# Patient Record
Sex: FEMALE | Race: WHITE | Employment: FULL TIME | URBAN - METROPOLITAN AREA
[De-identification: names, ages, dates, MRNs, and addresses within clinical notes are randomized per-mention and may not be internally consistent; named-entity substitution may affect disease eponyms.]

---

## 2018-10-24 ENCOUNTER — OFFICE VISIT (OUTPATIENT)
Dept: FAMILY MEDICINE CLINIC | Age: 28
End: 2018-10-24
Payer: COMMERCIAL

## 2018-10-24 VITALS
HEIGHT: 65 IN | SYSTOLIC BLOOD PRESSURE: 126 MMHG | HEART RATE: 80 BPM | WEIGHT: 134.2 LBS | OXYGEN SATURATION: 98 % | BODY MASS INDEX: 22.36 KG/M2 | DIASTOLIC BLOOD PRESSURE: 74 MMHG

## 2018-10-24 DIAGNOSIS — Z13.29 THYROID DISORDER SCREENING: ICD-10-CM

## 2018-10-24 DIAGNOSIS — G43.909 MIGRAINE WITHOUT STATUS MIGRAINOSUS, NOT INTRACTABLE, UNSPECIFIED MIGRAINE TYPE: Primary | ICD-10-CM

## 2018-10-24 DIAGNOSIS — Z13.220 SCREENING CHOLESTEROL LEVEL: ICD-10-CM

## 2018-10-24 PROCEDURE — 99204 OFFICE O/P NEW MOD 45 MIN: CPT | Performed by: NURSE PRACTITIONER

## 2018-10-24 ASSESSMENT — PATIENT HEALTH QUESTIONNAIRE - PHQ9
2. FEELING DOWN, DEPRESSED OR HOPELESS: 0
1. LITTLE INTEREST OR PLEASURE IN DOING THINGS: 0
SUM OF ALL RESPONSES TO PHQ QUESTIONS 1-9: 0
SUM OF ALL RESPONSES TO PHQ9 QUESTIONS 1 & 2: 0
SUM OF ALL RESPONSES TO PHQ QUESTIONS 1-9: 0

## 2019-05-21 ENCOUNTER — OFFICE VISIT (OUTPATIENT)
Dept: FAMILY MEDICINE CLINIC | Age: 29
End: 2019-05-21
Payer: COMMERCIAL

## 2019-05-21 VITALS
SYSTOLIC BLOOD PRESSURE: 122 MMHG | TEMPERATURE: 98.8 F | BODY MASS INDEX: 22.63 KG/M2 | WEIGHT: 136 LBS | HEART RATE: 68 BPM | DIASTOLIC BLOOD PRESSURE: 72 MMHG | OXYGEN SATURATION: 98 %

## 2019-05-21 DIAGNOSIS — J06.9 VIRAL URI: Primary | ICD-10-CM

## 2019-05-21 PROCEDURE — 99213 OFFICE O/P EST LOW 20 MIN: CPT | Performed by: NURSE PRACTITIONER

## 2019-05-21 ASSESSMENT — PATIENT HEALTH QUESTIONNAIRE - PHQ9
1. LITTLE INTEREST OR PLEASURE IN DOING THINGS: 0
SUM OF ALL RESPONSES TO PHQ QUESTIONS 1-9: 0
SUM OF ALL RESPONSES TO PHQ9 QUESTIONS 1 & 2: 0
2. FEELING DOWN, DEPRESSED OR HOPELESS: 0
SUM OF ALL RESPONSES TO PHQ QUESTIONS 1-9: 0

## 2019-05-21 NOTE — PROGRESS NOTES
Mohan Ortiz, P-C  [address]  [id number] [facility]. [address]  [phone]  [phone]    Gallo Amaya is a 26 y.o. female who is here with c/o of:    Chief Complaint: Follow-Up from Hospital (urgent care last saturday for diaherra, earaches and is getting worse ); Cough (started sunday night ); and Congestion      Patient Accompanied by: patient    HPI - Gallo Amaya is here today with c/o:    Patient has c/o bilateral ear pain (worse on the left) and ringing in the left ear, sore throat, productive (yellow) cough, and congestion. She also reports diarrhea occurring 3 times daily since this past Saturday. She reports she was seen in the  for this and was reportedly negative for strep and mono. She states she is currently taking azithromycin and prednisone for this problem and states she does not feel much better. Patient Active Problem List:     Dermatitis     Past Medical History:   Diagnosis Date    Cellulitis of digit 8/2013    Migraine       No past surgical history on file. Family History   Problem Relation Age of Onset    Cancer Paternal Aunt 48        breast    High Blood Pressure Father     High Cholesterol Father     Depression Father      Social History     Tobacco Use    Smoking status: Never Smoker    Smokeless tobacco: Never Used   Substance Use Topics    Alcohol use: Yes     ALLERGIES:    Allergies   Allergen Reactions    Amoxicillin     Pcn [Penicillins]           Subjective     · Constitutional:  Negative for activity change, appetite change,unexpected weight change, chills, fever, and fatigue. · HENT: Negative for rhinorrhea, sinus pain, sinus pressure, Positive for bilateral ear pain, sore throat, and congestion  · Eyes:  Negative for pain and discharge. · Respiratory:  Negative for chest tightness, shortness of breath, wheezing, and Positive for cough  · Cardiovascular:  Negative for chest pain, palpitations and leg swelling. · Gastrointestinal: Negative for abdominal pain, blood in stool, constipation, nausea and vomiting. Positive for diarrhea  · Endocrine: Negative for cold intolerance, heat intolerance, polydipsia, polyphagia and polyuria. · Genitourinary: Negative for difficulty urinating, dysuria, flank pain, frequency, hematuria and urgency. · Musculoskeletal: Negative for arthralgias, back pain, joint swelling, myalgias, neck pain and neck stiffness. · Skin: Negative for rash and wound. · Allergic/Immunologic: Negative for environmental allergies and food allergies. · Neurological:  Negative for dizziness, light-headedness, numbness and headaches. · Hematological:  Negative for adenopathy. Does not bruise/bleed easily. · Psychiatric/Behavioral: Negative for self-injury, sleep disturbance and suicidal ideas. Objective     PHYSICAL EXAM:   · Constitutional: Arabella Solano is oriented to person, place, and time. Vital signs are normal. Appears well-developed and well-nourished. · HEENT:   · Head: Normocephalic and atraumatic. Right Ear: Hearing and external ear normal. TM normal  Canal normal  · Left Ear: Hearing and external ear normal. TM normal Canal normal  · Nose: Nares normal. Septum midline. No drainage or sinus tenderness. Mucosa pink and moist  · Mouth/Throat: Oropharynx- No erythema, no exudate. Uvula midline, no erythema, no edema. Mucous membranes are pink and moist.   · Eyes:PERRL, EOMI, Conjunctiva normal, No discharge. · Neck: Full passive range of motion. Non-tender on palpation. Neck supple. No thyromegaly present. Trachea normal.  · Cardiovascular: Normal rate, regular rhythm, S1, S2, no murmur, no gallop, no friction rub, intact distal pulses. · Pulmonary/Chest: Breath sounds are clear throughout, No respiratory distress, No wheezing, No chest tenderness. Effort normal  · Abdominal: Soft. Normal appearance, bowel sounds are present and normoactive. There is no hepatosplenomegaly.  There is no Barriers to medication compliance addressed. All patient questions answered. Pt voiced understanding. 5.  Reviewed prior labs, imaging, consultation, follow up, and health maintenance  6. Continue current medications, diet and exercise. 7. Discussed use, benefit, and side effects of prescribed medications. Barriers to medication compliance addressed. All her questions were answered. Pt voiced understanding. Amparo Diggs will continue current medications, diet and exercise. Patient given educational materials on upper respiratory infection    Of the 15 minute duration appointment visit, Allen Keith CNP spent at least 50% of the face-to-face time in counseling, explanation of diagnosis, planning of further management, and answering all questions. Signed:  Allen Keith CNP    This note is created with the assistance of a speech-recognition program.  While intending to generate a document that actually reflects the content of the visit, no guarantees can be provided that every mistake has been identified and corrected by editing.

## 2019-05-21 NOTE — PROGRESS NOTES
Visit Information    Have you changed or started any medications since your last visit including any over-the-counter medicines, vitamins, or herbal medicines? no   Are you having any side effects from any of your medications? -  no  Have you stopped taking any of your medications? Is so, why? -  no    Have you seen any other physician or provider since your last visit? No  Have you had any other diagnostic tests since your last visit? No  Have you been seen in the emergency room and/or had an admission to a hospital since we last saw you? Yes - Records Requested  Have you had your routine dental cleaning in the past 6 months? yes -     Have you activated your [employer] account? If not, what are your barriers?  Yes     Patient Care Team:  ADIS Medina - CNP as PCP - General (Nurse Practitioner)    Medical History Review  Past Medical, Family, and Social History reviewed and does not contribute to the patient presenting condition    Health Maintenance   Topic Date Due    Varicella Vaccine (1 of 2 - 13+ 2-dose series) 04/04/2002    HIV screen  04/04/2004    DTaP/Tdap/Td vaccine (1 - Tdap) 04/04/2008    Cervical cancer screen  05/21/2015    Flu vaccine (Season Ended) 09/01/2019    Pneumococcal 0-64 years Vaccine  Aged Out

## 2019-06-08 ENCOUNTER — HOSPITAL ENCOUNTER (OUTPATIENT)
Dept: GENERAL RADIOLOGY | Age: 29
Discharge: HOME | End: 2019-06-10

## 2019-06-08 ENCOUNTER — HOSPITAL ENCOUNTER (OUTPATIENT)
Age: 29
Discharge: HOME | End: 2019-06-08

## 2019-06-08 DIAGNOSIS — Z00.00 PHYSICAL EXAM: ICD-10-CM

## 2019-06-08 LAB
ABSOLUTE EOS #: 0.11 K/UL (ref 0–0.44)
ABSOLUTE IMMATURE GRANULOCYTE: <0.03 K/UL (ref 0–0.3)
ABSOLUTE LYMPH #: 2.1 K/UL (ref 1.1–3.7)
ABSOLUTE MONO #: 0.57 K/UL (ref 0.1–1.2)
ALBUMIN SERPL-MCNC: 4.6 G/DL (ref 3.5–5.2)
ALBUMIN/GLOBULIN RATIO: 1.4 (ref 1–2.5)
ALP BLD-CCNC: 56 U/L (ref 35–104)
ALT SERPL-CCNC: 15 U/L (ref 5–33)
ANION GAP SERPL CALCULATED.3IONS-SCNC: 10 MMOL/L (ref 9–17)
AST SERPL-CCNC: 19 U/L
BASOPHILS # BLD: 1 % (ref 0–2)
BASOPHILS ABSOLUTE: 0.05 K/UL (ref 0–0.2)
BILIRUB SERPL-MCNC: 0.81 MG/DL (ref 0.3–1.2)
BUN BLDV-MCNC: 15 MG/DL (ref 6–20)
BUN/CREAT BLD: NORMAL (ref 9–20)
CALCIUM SERPL-MCNC: 9.4 MG/DL (ref 8.6–10.4)
CHLORIDE BLD-SCNC: 105 MMOL/L (ref 98–107)
CHOLESTEROL/HDL RATIO: 2.7
CHOLESTEROL: 157 MG/DL
CO2: 20 MMOL/L (ref 20–31)
CREAT SERPL-MCNC: 0.65 MG/DL (ref 0.5–0.9)
DIFFERENTIAL TYPE: NORMAL
EOSINOPHILS RELATIVE PERCENT: 1 % (ref 1–4)
GFR AFRICAN AMERICAN: >60 ML/MIN
GFR NON-AFRICAN AMERICAN: >60 ML/MIN
GFR SERPL CREATININE-BSD FRML MDRD: NORMAL ML/MIN/{1.73_M2}
GFR SERPL CREATININE-BSD FRML MDRD: NORMAL ML/MIN/{1.73_M2}
GLOBULIN: NORMAL G/DL (ref 1.5–3.8)
GLUCOSE BLD-MCNC: 83 MG/DL (ref 70–99)
HCG QUALITATIVE: NEGATIVE
HCT VFR BLD CALC: 42.7 % (ref 36.3–47.1)
HDLC SERPL-MCNC: 59 MG/DL
HEMOGLOBIN: 13.5 G/DL (ref 11.9–15.1)
IMMATURE GRANULOCYTES: 0 %
IRON: 60 UG/DL (ref 37–145)
LACTATE DEHYDROGENASE: 187 U/L (ref 135–214)
LDL CHOLESTEROL: 85 MG/DL (ref 0–130)
LYMPHOCYTES # BLD: 27 % (ref 24–43)
MCH RBC QN AUTO: 29.7 PG (ref 25.2–33.5)
MCHC RBC AUTO-ENTMCNC: 31.6 G/DL (ref 28.4–34.8)
MCV RBC AUTO: 94.1 FL (ref 82.6–102.9)
MONOCYTES # BLD: 7 % (ref 3–12)
NRBC AUTOMATED: 0 PER 100 WBC
PDW BLD-RTO: 11.9 % (ref 11.8–14.4)
PHOSPHORUS: 4 MG/DL (ref 2.6–4.5)
PLATELET # BLD: 322 K/UL (ref 138–453)
PLATELET ESTIMATE: NORMAL
PMV BLD AUTO: 10.1 FL (ref 8.1–13.5)
POTASSIUM SERPL-SCNC: 3.9 MMOL/L (ref 3.7–5.3)
RBC # BLD: 4.54 M/UL (ref 3.95–5.11)
RBC # BLD: NORMAL 10*6/UL
SEG NEUTROPHILS: 64 % (ref 36–65)
SEGMENTED NEUTROPHILS ABSOLUTE COUNT: 4.94 K/UL (ref 1.5–8.1)
SODIUM BLD-SCNC: 135 MMOL/L (ref 135–144)
TOTAL PROTEIN: 7.8 G/DL (ref 6.4–8.3)
TRIGL SERPL-MCNC: 64 MG/DL
URIC ACID: 3.6 MG/DL (ref 2.4–5.7)
VLDLC SERPL CALC-MCNC: NORMAL MG/DL (ref 1–30)
WBC # BLD: 7.8 K/UL (ref 3.5–11.3)
WBC # BLD: NORMAL 10*3/UL

## 2019-06-08 PROCEDURE — 86480 TB TEST CELL IMMUN MEASURE: CPT

## 2019-06-08 PROCEDURE — 83615 LACTATE (LD) (LDH) ENZYME: CPT

## 2019-06-08 PROCEDURE — 85025 COMPLETE CBC W/AUTO DIFF WBC: CPT

## 2019-06-08 PROCEDURE — 80061 LIPID PANEL: CPT

## 2019-06-08 PROCEDURE — 84100 ASSAY OF PHOSPHORUS: CPT

## 2019-06-08 PROCEDURE — 80053 COMPREHEN METABOLIC PANEL: CPT

## 2019-06-08 PROCEDURE — 83540 ASSAY OF IRON: CPT

## 2019-06-08 PROCEDURE — 36415 COLL VENOUS BLD VENIPUNCTURE: CPT

## 2019-06-08 PROCEDURE — 84550 ASSAY OF BLOOD/URIC ACID: CPT

## 2019-06-08 PROCEDURE — 71045 X-RAY EXAM CHEST 1 VIEW: CPT

## 2019-06-08 PROCEDURE — 84703 CHORIONIC GONADOTROPIN ASSAY: CPT

## 2019-06-10 LAB
QUANTI TB GOLD PLUS: NEGATIVE
QUANTI TB1 MINUS NIL: 0 IU/ML (ref 0–0.34)
QUANTI TB2 MINUS NIL: 0.01 IU/ML (ref 0–0.34)
QUANTIFERON MITOGEN: >10 IU/ML
QUANTIFERON NIL: 0.02 IU/ML

## 2020-08-26 ENCOUNTER — NURSE TRIAGE (OUTPATIENT)
Dept: OTHER | Facility: CLINIC | Age: 30
End: 2020-08-26

## 2020-08-26 NOTE — TELEPHONE ENCOUNTER
Reason for Disposition   Message left on unidentified voice mail. Phone number verified. Protocols used: NO CONTACT OR DUPLICATE CONTACT CALL-ADULT-[place]    Patient called [facility]) to schedule appointment, with red flag complaint, transferred to RN access for triage. [facility] lost contact with pt due to phone system issues. Obtained pt information and returned call by this writer. Attempted to contact pt for triage of possible UTI symptoms. No answer. LVM. No triage at this time. Please do not respond to the triage nurse through this encounter. Any subsequent communication should be directly with the patient.

## 2020-08-31 ENCOUNTER — HOSPITAL ENCOUNTER (OUTPATIENT)
Age: 30
Setting detail: SPECIMEN
Discharge: HOME | End: 2020-08-31
Payer: COMMERCIAL

## 2020-08-31 ENCOUNTER — OFFICE VISIT (OUTPATIENT)
Dept: FAMILY MEDICINE CLINIC | Age: 30
End: 2020-08-31
Payer: COMMERCIAL

## 2020-08-31 VITALS
WEIGHT: 132.6 LBS | TEMPERATURE: 98.8 F | HEIGHT: 64 IN | SYSTOLIC BLOOD PRESSURE: 115 MMHG | BODY MASS INDEX: 22.64 KG/M2 | HEART RATE: 60 BPM | OXYGEN SATURATION: 99 % | DIASTOLIC BLOOD PRESSURE: 80 MMHG

## 2020-08-31 LAB
BILIRUBIN, POC: NORMAL
BLOOD URINE, POC: NORMAL
CLARITY, POC: CLEAR
COLOR, POC: YELLOW
GLUCOSE URINE, POC: NORMAL
KETONES, POC: NORMAL
LEUKOCYTE EST, POC: NORMAL
NITRITE, POC: NORMAL
PH, POC: 7
PROTEIN, POC: NORMAL
SPECIFIC GRAVITY, POC: 1.02
UROBILINOGEN, POC: 0.2

## 2020-08-31 PROCEDURE — 99213 OFFICE O/P EST LOW 20 MIN: CPT | Performed by: NURSE PRACTITIONER

## 2020-08-31 PROCEDURE — 81002 URINALYSIS NONAUTO W/O SCOPE: CPT | Performed by: NURSE PRACTITIONER

## 2020-08-31 SDOH — ECONOMIC STABILITY: FOOD INSECURITY: WITHIN THE PAST 12 MONTHS, THE FOOD YOU BOUGHT JUST DIDN'T LAST AND YOU DIDN'T HAVE MONEY TO GET MORE.: NEVER TRUE

## 2020-08-31 SDOH — ECONOMIC STABILITY: INCOME INSECURITY: HOW HARD IS IT FOR YOU TO PAY FOR THE VERY BASICS LIKE FOOD, HOUSING, MEDICAL CARE, AND HEATING?: NOT HARD AT ALL

## 2020-08-31 SDOH — ECONOMIC STABILITY: FOOD INSECURITY: WITHIN THE PAST 12 MONTHS, YOU WORRIED THAT YOUR FOOD WOULD RUN OUT BEFORE YOU GOT MONEY TO BUY MORE.: NEVER TRUE

## 2020-08-31 ASSESSMENT — PATIENT HEALTH QUESTIONNAIRE - PHQ9
SUM OF ALL RESPONSES TO PHQ QUESTIONS 1-9: 0
SUM OF ALL RESPONSES TO PHQ9 QUESTIONS 1 & 2: 0
1. LITTLE INTEREST OR PLEASURE IN DOING THINGS: 0
2. FEELING DOWN, DEPRESSED OR HOPELESS: 0
SUM OF ALL RESPONSES TO PHQ QUESTIONS 1-9: 0

## 2020-08-31 NOTE — PROGRESS NOTES
Sarah Whitehead, YU-C  [address]  [id number] [facility]. [address]  [phone]  [phone]    Gregg Guan is a 31 y.o. female who is here with c/o of:    Chief Complaint: Dysuria      Patient Accompanied by: n/a    HPI - Gregg Guan is here today with c/o:    Dysuria   - c/o sharp pain to left groin after urinating that started last Sunday (1 week ago)   - she reports the pain resolves after urinating   - she denies urethral pain, frequency, urgency, flank pain    Diarrhea   - this has been a problem for several years (15 years)   - she reports all b.m's are \"straight liquid\" and has had incontinence due to this issue   - she has seen previous provider for this and was told to track her diet and has not had f/u with gastroenterology   - she states this is impacting her life       Patient Active Problem List:     Dermatitis     Past Medical History:   Diagnosis Date    Cellulitis of digit 8/2013    Migraine       No past surgical history on file. Family History   Problem Relation Age of Onset    Cancer Paternal Aunt 48        breast    High Blood Pressure Father     High Cholesterol Father     Depression Father      Social History     Tobacco Use    Smoking status: Never Smoker    Smokeless tobacco: Never Used   Substance Use Topics    Alcohol use: Yes     ALLERGIES:    Allergies   Allergen Reactions    Amoxicillin     Pcn [Penicillins]           Subjective     · Constitutional:  Negative for activity change, appetite change,unexpected weight change, chills, fever, and fatigue. · HENT: Negative for ear pain, sore throat,  Rhinorrhea, sinus pain, sinus pressure, congestion. · Eyes:  Negative for pain and discharge. · Respiratory:  Negative for chest tightness, shortness of breath, wheezing, and cough. · Cardiovascular:  Negative for chest pain, palpitations and leg swelling.    · Gastrointestinal: Negative for abdominal pain, blood in stool, constipation, nausea and vomiting. Positive for diarrhea  · Endocrine: Negative for cold intolerance, heat intolerance, polydipsia, polyphagia and polyuria. · Genitourinary: Negative for difficulty urinating, flank pain, frequency, hematuria and urgency. Positive for dysuria  · Musculoskeletal: Negative for arthralgias, back pain, joint swelling, myalgias, neck pain and neck stiffness. · Skin: Negative for rash and wound. · Allergic/Immunologic: Negative for environmental allergies and food allergies. · Neurological:  Negative for dizziness, light-headedness, numbness and headaches. · Hematological:  Negative for adenopathy. Does not bruise/bleed easily. · Psychiatric/Behavioral: Negative for self-injury, sleep disturbance and suicidal ideas. Objective     PHYSICAL EXAM:   · Constitutional: Merna Parnell is oriented to person, place, and time. Vital signs are normal. Appears well-developed and well-nourished. · HEENT:   · Head: Normocephalic and atraumatic. Right Ear: Hearing and external ear normal.     · Left Ear: Hearing and external ear normal.    · Eyes:PERRL, EOMI, Conjunctiva normal, No discharge. · Neck: Full passive range of motion. Non-tender on palpation. Neck supple. No thyromegaly present. Trachea normal.  · Cardiovascular: Normal rate, regular rhythm, S1, S2, no murmur, no gallop, no friction rub, intact distal pulses. · Pulmonary/Chest: Breath sounds are clear throughout, No respiratory distress, No wheezing, No chest tenderness. Effort normal  · Abdominal: Soft. Normal appearance, bowel sounds are present and normoactive. There is no hepatosplenomegaly. There is no tenderness. There is no CVA tenderness. · Musculoskeletal: Extremities appear regular and symmetric. No evident masses, lesions, foreign bodies, or other abnormalities. No edema. No tenderness on palpation. Joints are stable. Full ROM, strength and tone are within normal limits. · Lymphadenopathy: No lymphadenopathy noted. · Neurological: Alert and oriented to person, place, and time. Normal motor function, Normal sensory function, No focal deficits noted. He has normal strength. · Skin: Skin is warm, dry and intact. No obvious lesions on exposed skin  · Psychiatric: Normal mood and affect. Speech is normal and behavior is normal.     Nursing note and vitals reviewed. Blood pressure 115/80, pulse 60, temperature 98.8 °F (37.1 °C), temperature source Temporal, height 5' 4\" (1.626 m), weight 132 lb 9.6 oz (60.1 kg), SpO2 99 %. Body mass index is 22.76 kg/m². Wt Readings from Last 3 Encounters:   08/31/20 132 lb 9.6 oz (60.1 kg)   05/21/19 136 lb (61.7 kg)   10/24/18 134 lb 3.2 oz (60.9 kg)     BP Readings from Last 3 Encounters:   08/31/20 115/80   05/21/19 122/72   10/24/18 126/74       Results for POC orders placed in visit on 08/31/20   POCT Urinalysis no Micro   Result Value Ref Range    Color, UA Yellow     Clarity, UA Clear     Glucose, UA POC Neg     Bilirubin, UA Neg     Ketones, UA Neg     Spec Grav, UA 1.025     Blood, UA POC Neg     pH, UA 7.0     Protein, UA POC Neg     Urobilinogen, UA 0.2     Leukocytes, UA Neg     Nitrite, UA Neg        Completed Orders/Prescriptions   No orders of the defined types were placed in this encounter. AssessmentPlan/Medical Decision Making     1. Pelvic pain in female  - I feel this is related to the chronic diarrhea and have referred to GI  - further dx testing pending GI consultation  - POCT Urinalysis no Micro  - Culture, Urine; Future    2. Chronic diarrhea  - Jennifer Dumont MD, Gastroenterology, [address]          Return if symptoms worsen or fail to improve. 1.  Christie received counseling on the following healthy behaviors: nutrition, exercise and medication adherence  2. Patient given educational materials - see patient instructions  3. Was a self-tracking handout given in paper form or via [email or web address]? No  If yes, see orders or list here.   4. Discussed use, benefit, and side effects of prescribed medications. Barriers to medication compliance addressed. All patient questions answered. Pt voiced understanding. 5.  Reviewed prior labs, imaging, consultation, follow up, and health maintenance  6. Continue current medications, diet and exercise. 7. Discussed use, benefit, and side effects of prescribed medications. Barriers to medication compliance addressed. All her questions were answered. Pt voiced understanding. Ronn Bhagat will continue current medications, diet and exercise. Of the 15 minute duration appointment visit, Izzy Robles CNP spent at least 50% of the face-to-face time in counseling, explanation of diagnosis, planning of further management, and answering all questions. Signed:  Izzy Robles CNP    This note is created with the assistance of a speech-recognition program.  While intending to generate a document that actually reflects the content of the visit, no guarantees can be provided that every mistake has been identified and corrected by editing.

## 2020-09-01 LAB
CULTURE: NO GROWTH
Lab: NORMAL
SPECIMEN DESCRIPTION: NORMAL

## 2020-10-12 ENCOUNTER — HOSPITAL ENCOUNTER (OUTPATIENT)
Age: 30
Setting detail: SPECIMEN
Discharge: HOME | End: 2020-10-12
Payer: COMMERCIAL

## 2020-10-12 ENCOUNTER — OFFICE VISIT (OUTPATIENT)
Dept: GASTROENTEROLOGY | Age: 30
End: 2020-10-12
Payer: COMMERCIAL

## 2020-10-12 VITALS — BODY MASS INDEX: 23 KG/M2 | WEIGHT: 134 LBS

## 2020-10-12 PROCEDURE — 99204 OFFICE O/P NEW MOD 45 MIN: CPT | Performed by: INTERNAL MEDICINE

## 2020-10-12 RX ORDER — SODIUM, POTASSIUM,MAG SULFATES 17.5-3.13G
SOLUTION, RECONSTITUTED, ORAL ORAL
Qty: 1 BOTTLE | Refills: 0 | Status: SHIPPED | OUTPATIENT
Start: 2020-10-12 | End: 2020-12-07

## 2020-10-12 RX ORDER — OCTISALATE, AVOBENZONE, HOMOSALATE, AND OCTOCRYLENE 29.4; 29.4; 49; 25.48 MG/ML; MG/ML; MG/ML; MG/ML
1 LOTION TOPICAL DAILY
Qty: 30 CAPSULE | Refills: 1 | Status: SHIPPED | OUTPATIENT
Start: 2020-10-12 | End: 2020-12-07 | Stop reason: SDUPTHER

## 2020-10-12 ASSESSMENT — ENCOUNTER SYMPTOMS
SORE THROAT: 0
RECTAL PAIN: 0
VOICE CHANGE: 1
WHEEZING: 0
ABDOMINAL DISTENTION: 1
SINUS PRESSURE: 1
ANAL BLEEDING: 1
ABDOMINAL PAIN: 1
NAUSEA: 0
COUGH: 0
BACK PAIN: 0
DIARRHEA: 1
VOMITING: 0
CONSTIPATION: 1
TROUBLE SWALLOWING: 0
BLOOD IN STOOL: 0
CHOKING: 0

## 2020-10-12 NOTE — PROGRESS NOTES
Reason for Referral:   García Porras, APRN - CNP  [address] [facility]    Chief Complaint   Patient presents with    Diarrhea     NP referral for chronic diarrhea, c/o IBS like symptoms, some rectal bleeding as well           HISTORY OF PRESENT ILLNESS: Sherita Herman is a 31 y.o. female , referred for evaluation of diarrhea and rectal bleeding. She reports issues since 2007. Urgency. Loose bowel movement. Sometimes with bright red blood. No abdominal pain. No nausea or vomiting. No weight loss. No skin rash or joint swelling. Occasional sores in her mouth. Urgency is typically after eating fatty foods sometimes after eating ice cream.  No prior endoscopy. No family history of known GI pathology. She smokes marijuana occasionally. She reports drinking around 25 alcoholic drinks per week. She has been doing that for around 5 years. Past Medical,Family, and Social History reviewed and does not contribute to the patient presentingcondition. Patient's PMH/PSH,SH,PSYCH Hx, MEDs, ALLERGIES, and ROS were all reviewed and updated in the appropriate sections. PAST MEDICAL HISTORY:  Past Medical History:   Diagnosis Date    Cellulitis of digit 8/2013    Chronic diarrhea     Migraine        No past surgical history on file. CURRENT MEDICATIONS:  No current outpatient medications on file.     ALLERGIES:   Allergies   Allergen Reactions    Amoxicillin     Pcn [Penicillins]        FAMILY HISTORY:       Problem Relation Age of Onset    Cancer Paternal Aunt 48        breast    High Blood Pressure Father     High Cholesterol Father     Depression Father          SOCIAL HISTORY:   Social History     Socioeconomic History    Marital status: Single     Spouse name: Not on file    Number of children: Not on file    Years of education: Not on file    Highest education level: Not on file   Occupational History    Not on file   Social Needs    Financial resource strain: Not hard at all    Food insecurity     Worry: Never true     Inability: Never true   [employer] needs     Medical: Not on file     Non-medical: Not on file   Tobacco Use    Smoking status: Never Smoker    Smokeless tobacco: Never Used   Substance and Sexual Activity    Alcohol use: Yes    Drug use: No     Types: Marijuana    Sexual activity: Yes   Lifestyle    Physical activity     Days per week: Not on file     Minutes per session: Not on file    Stress: Not on file   Relationships    Social connections     Talks on phone: Not on file     Gets together: Not on file     Attends Sikh service: Not on file     Active member of club or organization: Not on file     Attends meetings of clubs or organizations: Not on file     Relationship status: Not on file    Intimate partner violence     Fear of current or ex partner: Not on file     Emotionally abused: Not on file     Physically abused: Not on file     Forced sexual activity: Not on file   Other Topics Concern    Not on file   Social History Narrative    Not on file       REVIEW OF SYSTEMS: A 12-point review of systemswas obtained and pertinent positives and negatives were enumerated above in the history of present illness. All other reviewed systems / symptoms were negative. Review of Systems   Constitutional: Negative for appetite change, fatigue and unexpected weight change. HENT: Positive for postnasal drip, sinus pressure and voice change. Negative for dental problem, sore throat and trouble swallowing. Eyes: Positive for visual disturbance. Respiratory: Negative for cough, choking and wheezing. Cardiovascular: Negative for chest pain, palpitations and leg swelling. Gastrointestinal: Positive for abdominal distention, abdominal pain, anal bleeding, constipation and diarrhea. Negative for blood in stool, nausea, rectal pain and vomiting. GERD-some burning   Genitourinary: Negative for difficulty urinating. Musculoskeletal: Negative for arthralgias, back pain, gait problem and myalgias. Allergic/Immunologic: Negative for environmental allergies and food allergies. Neurological: Negative for dizziness, weakness, light-headedness, numbness and headaches. Hematological: Does not bruise/bleed easily. Psychiatric/Behavioral: Negative for sleep disturbance. The patient is not nervous/anxious. LABORATORY DATA: Reviewed  Lab Results   Component Value Date    WBC 7.8 06/08/2019    HGB 13.5 06/08/2019    HCT 42.7 06/08/2019    MCV 94.1 06/08/2019     06/08/2019     06/08/2019    K 3.9 06/08/2019     06/08/2019    CO2 20 06/08/2019    BUN 15 06/08/2019    CREATININE 0.65 06/08/2019    LABALBU 4.6 06/08/2019    BILITOT 0.81 06/08/2019    ALKPHOS 56 06/08/2019    AST 19 06/08/2019    ALT 15 06/08/2019         Lab Results   Component Value Date    RBC 4.54 06/08/2019    HGB 13.5 06/08/2019    MCV 94.1 06/08/2019    MCH 29.7 06/08/2019    MCHC 31.6 06/08/2019    RDW 11.9 06/08/2019    MPV 10.1 06/08/2019    BASOPCT 1 06/08/2019    LYMPHSABS 2.10 06/08/2019    MONOSABS 0.57 06/08/2019    NEUTROABS 4.94 06/08/2019    EOSABS 0.11 06/08/2019    BASOSABS 0.05 06/08/2019         DIAGNOSTIC TESTING:     No results found. There were no vitals taken for this visit. PHYSICAL EXAMINATION: Vital signs reviewed per the nursing documentation. There is no height or weight on file to calculate BMI. Physical Exam      I personally reviewed the nurse's notes and documentation and I agree with her notes. General: alert, appears stated age and cooperative Psych: Normal. and Alert and oriented, appropriate affect. . Normal affect. Mentation normal  HEENT: PERRLA. Clear conjunctivae and sclerae. Moist oral mucosae, no lesions or ulcers. The neck is supple, without lymphadenopathy or jugular venous distension. No masses. Normal thyroid. Cardiovascular: S1 S2 RRR no rubs or murmurs.   Pulmonary: clear BL. No accessory muscle usage. Abdominal Exam: Soft, NT ND, no hepato or spleno megaly, +BS, no ascites. No groin masses or lymphadenopathy. Extremities: no lower ext edema. Skin: Warm skin. No skin rash. No spider nevi palmar erythema nail dystrophy. Joint: No joint swelling or deformity. Neurological: intact sensory. DTR+. IMPRESSION: Ms. Cherie Gutierrez is a 31 y.o. female with intermittent diarrhea with urgency. Rectal bleeding. We discussed differential diagnosis. Dietary changes. Trial of align. We will obtain celiac serology. Plan for colonoscopy with biopsies. Follow-up in 1 month. We recommend the patient to cut down on alcohol. We discussed potential complications. Spent 30 minutes providing patient education and counseling. Thank you for allowing me to participate in the care of Ms. Cherie Gutierrez. For any further questions please do not hesitate to contact me. I have reviewed and agree with the MA/LPN ROS. Note is dictated utilizing voice recognition software. Unfortunately this leads to occasional typographical errors. Please contact our office if you have any questions.     Elliott Lu MD  [facility]  O: #[phone]

## 2020-10-13 LAB — TISSUE TRANSGLUTAMINASE IGA: 0.3 U/ML

## 2020-10-19 ENCOUNTER — HOSPITAL ENCOUNTER (OUTPATIENT)
Dept: PREADMISSION TESTING | Age: 30
Setting detail: SPECIMEN
Discharge: HOME | End: 2020-10-23
Payer: COMMERCIAL

## 2020-11-19 ENCOUNTER — HOSPITAL ENCOUNTER (OUTPATIENT)
Dept: PREADMISSION TESTING | Age: 30
Setting detail: SPECIMEN
Discharge: HOME | End: 2020-11-23
Payer: COMMERCIAL

## 2020-11-19 PROCEDURE — U0003 INFECTIOUS AGENT DETECTION BY NUCLEIC ACID (DNA OR RNA); SEVERE ACUTE RESPIRATORY SYNDROME CORONAVIRUS 2 (SARS-COV-2) (CORONAVIRUS DISEASE [COVID-19]), AMPLIFIED PROBE TECHNIQUE, MAKING USE OF HIGH THROUGHPUT TECHNOLOGIES AS DESCRIBED BY CMS-2020-01-R: HCPCS

## 2020-11-21 LAB — SARS-COV-2, NAA: NOT DETECTED

## 2020-11-23 ENCOUNTER — ANESTHESIA (OUTPATIENT)
Dept: OPERATING ROOM | Age: 30
End: 2020-11-23
Payer: COMMERCIAL

## 2020-11-23 ENCOUNTER — ANESTHESIA EVENT (OUTPATIENT)
Dept: OPERATING ROOM | Age: 30
End: 2020-11-23
Payer: COMMERCIAL

## 2020-11-23 ENCOUNTER — HOSPITAL ENCOUNTER (OUTPATIENT)
Age: 30
Setting detail: OUTPATIENT SURGERY
Discharge: HOME | End: 2020-11-23
Attending: INTERNAL MEDICINE | Admitting: INTERNAL MEDICINE
Payer: COMMERCIAL

## 2020-11-23 VITALS
DIASTOLIC BLOOD PRESSURE: 72 MMHG | BODY MASS INDEX: 23.92 KG/M2 | TEMPERATURE: 97.4 F | RESPIRATION RATE: 14 BRPM | SYSTOLIC BLOOD PRESSURE: 104 MMHG | WEIGHT: 135 LBS | HEART RATE: 66 BPM | OXYGEN SATURATION: 100 % | HEIGHT: 63 IN

## 2020-11-23 VITALS
RESPIRATION RATE: 24 BRPM | OXYGEN SATURATION: 99 % | DIASTOLIC BLOOD PRESSURE: 66 MMHG | SYSTOLIC BLOOD PRESSURE: 108 MMHG

## 2020-11-23 LAB — HCG, PREGNANCY URINE (POC): NEGATIVE

## 2020-11-23 PROCEDURE — 3700000001 HC ADD 15 MINUTES (ANESTHESIA): Performed by: INTERNAL MEDICINE

## 2020-11-23 PROCEDURE — 7100000011 HC PHASE II RECOVERY - ADDTL 15 MIN: Performed by: INTERNAL MEDICINE

## 2020-11-23 PROCEDURE — 2580000003 HC RX 258: Performed by: ANESTHESIOLOGY

## 2020-11-23 PROCEDURE — 81025 URINE PREGNANCY TEST: CPT

## 2020-11-23 PROCEDURE — 88305 TISSUE EXAM BY PATHOLOGIST: CPT

## 2020-11-23 PROCEDURE — 3609010300 HC COLONOSCOPY W/BIOPSY SINGLE/MULTIPLE: Performed by: INTERNAL MEDICINE

## 2020-11-23 PROCEDURE — 6360000002 HC RX W HCPCS: Performed by: NURSE ANESTHETIST, CERTIFIED REGISTERED

## 2020-11-23 PROCEDURE — 7100000010 HC PHASE II RECOVERY - FIRST 15 MIN: Performed by: INTERNAL MEDICINE

## 2020-11-23 PROCEDURE — 3700000000 HC ANESTHESIA ATTENDED CARE: Performed by: INTERNAL MEDICINE

## 2020-11-23 PROCEDURE — 45380 COLONOSCOPY AND BIOPSY: CPT | Performed by: INTERNAL MEDICINE

## 2020-11-23 PROCEDURE — 2709999900 HC NON-CHARGEABLE SUPPLY: Performed by: INTERNAL MEDICINE

## 2020-11-23 RX ORDER — LIDOCAINE HYDROCHLORIDE 10 MG/ML
1 INJECTION, SOLUTION EPIDURAL; INFILTRATION; INTRACAUDAL; PERINEURAL
Status: DISCONTINUED | OUTPATIENT
Start: 2020-11-23 | End: 2020-11-23 | Stop reason: HOSPADM

## 2020-11-23 RX ORDER — HYDROMORPHONE HCL 110MG/55ML
0.25 PATIENT CONTROLLED ANALGESIA SYRINGE INTRAVENOUS EVERY 5 MIN PRN
Status: DISCONTINUED | OUTPATIENT
Start: 2020-11-23 | End: 2020-11-23 | Stop reason: HOSPADM

## 2020-11-23 RX ORDER — PROPOFOL 10 MG/ML
INJECTION, EMULSION INTRAVENOUS PRN
Status: DISCONTINUED | OUTPATIENT
Start: 2020-11-23 | End: 2020-11-23 | Stop reason: SDUPTHER

## 2020-11-23 RX ORDER — ONDANSETRON 2 MG/ML
4 INJECTION INTRAMUSCULAR; INTRAVENOUS
Status: DISCONTINUED | OUTPATIENT
Start: 2020-11-23 | End: 2020-11-23 | Stop reason: HOSPADM

## 2020-11-23 RX ORDER — FENTANYL CITRATE 50 UG/ML
25 INJECTION, SOLUTION INTRAMUSCULAR; INTRAVENOUS EVERY 5 MIN PRN
Status: DISCONTINUED | OUTPATIENT
Start: 2020-11-23 | End: 2020-11-23 | Stop reason: HOSPADM

## 2020-11-23 RX ORDER — SODIUM CHLORIDE, SODIUM LACTATE, POTASSIUM CHLORIDE, CALCIUM CHLORIDE 600; 310; 30; 20 MG/100ML; MG/100ML; MG/100ML; MG/100ML
INJECTION, SOLUTION INTRAVENOUS CONTINUOUS
Status: DISCONTINUED | OUTPATIENT
Start: 2020-11-23 | End: 2020-11-23 | Stop reason: HOSPADM

## 2020-11-23 RX ADMIN — PROPOFOL 50 MG: 10 INJECTION, EMULSION INTRAVENOUS at 09:14

## 2020-11-23 RX ADMIN — PROPOFOL 50 MG: 10 INJECTION, EMULSION INTRAVENOUS at 09:11

## 2020-11-23 RX ADMIN — PROPOFOL 50 MG: 10 INJECTION, EMULSION INTRAVENOUS at 09:08

## 2020-11-23 RX ADMIN — PROPOFOL 50 MG: 10 INJECTION, EMULSION INTRAVENOUS at 09:13

## 2020-11-23 RX ADMIN — SODIUM CHLORIDE, POTASSIUM CHLORIDE, SODIUM LACTATE AND CALCIUM CHLORIDE: 600; 310; 30; 20 INJECTION, SOLUTION INTRAVENOUS at 08:08

## 2020-11-23 RX ADMIN — PROPOFOL 50 MG: 10 INJECTION, EMULSION INTRAVENOUS at 09:15

## 2020-11-23 RX ADMIN — PROPOFOL 50 MG: 10 INJECTION, EMULSION INTRAVENOUS at 09:12

## 2020-11-23 RX ADMIN — PROPOFOL 50 MG: 10 INJECTION, EMULSION INTRAVENOUS at 09:17

## 2020-11-23 RX ADMIN — PROPOFOL 50 MG: 10 INJECTION, EMULSION INTRAVENOUS at 09:10

## 2020-11-23 RX ADMIN — PROPOFOL 50 MG: 10 INJECTION, EMULSION INTRAVENOUS at 09:09

## 2020-11-23 ASSESSMENT — PAIN SCALES - GENERAL
PAINLEVEL_OUTOF10: 0

## 2020-11-23 ASSESSMENT — PULMONARY FUNCTION TESTS
PIF_VALUE: 0

## 2020-11-23 ASSESSMENT — PAIN - FUNCTIONAL ASSESSMENT: PAIN_FUNCTIONAL_ASSESSMENT: 0-10

## 2020-11-23 NOTE — H&P
History and Physical Service   [facility] [place] - INPATIENT    HISTORY AND PHYSICAL EXAMINATION            Date of Evaluation: 11/23/2020  Patient name:  Leslie Hartley  MRN:   [id number]  YOB: 1990  PCP:    ADIS Trevino CNP    History Obtained From:     Patient, medical records    History of Present Illness: This is Leslie Hartley a 31 y.o. female who presents today for a diagnostic colonoscopy by Dr. Charles Avila for diarrhea. Patient c/o fecal urgency with loose stool intermittently since 2007 that occurs mostly past eating fatty foods or ice cream according to Dr Joshua Madrigal note of 10/12/20. Occasionally has blood when wiping but not mixed with stools  No previous colonoscopy  No FH colon cancer or polyps  Takes probiotics  Patient followed the bowel prep until watery clear  She denies new bowel changes, bloody tarry stools, abdominal pain, or unintentional weight loss. Past Medical History:     Past Medical History:   Diagnosis Date    Cellulitis of digit 8/2013    Chronic diarrhea     Migraine         Past Surgical History:     History reviewed. No pertinent surgical history. Medications Prior to Admission:     Prior to Admission medications    Medication Sig Start Date End Date Taking? Authorizing Provider   Probiotic Product (ALIGN) 4 MG CAPS Take 1 capsule by mouth daily 10/12/20  Yes Charles Avila MD   Na Sulfate-K Sulfate-Mg Sulf 17.5-3.13-1.6 GM/177ML SOLN Please follow instructions given by office 10/12/20  Yes Charles Avila MD        Allergies:     Amoxicillin and Pcn [penicillins]    Social History:     Tobacco:    reports that she has never smoked. She has never used smokeless tobacco.  Alcohol:      reports current alcohol use of about 2.0 standard drinks of alcohol per week. Drug Use:  reports no history of drug use.     Family History:     Family History   Problem Relation Age of Onset    Cancer Paternal Aunt 48        breast    High Blood Pressure Father     High Cholesterol Father     Depression Father        Review of Systems:     Positive and Negative as described in HPI. CONSTITUTIONAL:  negative for fevers, chills, sweats, fatigue, weight loss  HEENT:  negative for vision, hearing changes, runny nose, throat pain  RESPIRATORY:  negative for shortness of breath, cough, congestion, wheezing. CARDIOVASCULAR:  negative for chest pain, palpitations. GASTROINTESTINAL: See HPI  negative for nausea, vomiting, diarrhea, constipation, change in bowel habits, abdominal pain   GENITOURINARY:  negative for difficulty of urination, burning with urination, frequency   INTEGUMENT:  negative for rash, skin lesions, easy bruising   HEMATOLOGIC/LYMPHATIC:  negative for swelling/edema   ALLERGIC/IMMUNOLOGIC:  negative for urticaria , itching  ENDOCRINE:  negative increase in drinking, increase in urination, hot or cold intolerance  MUSCULOSKELETAL:  negative joint pains, muscle aches, swelling of joints  NEUROLOGICAL:  negative for headaches, dizziness, lightheadedness, numbness, pain, tingling extremities  BEHAVIOR/PSYCH:  negative for depression, anxiety    Physical Exam:   /88   Pulse 99   Temp 96 °F (35.6 °C) (Oral)   Resp 16   Ht 5' 3\" (1.6 m)   Wt 135 lb (61.2 kg)   SpO2 97%   BMI 23.91 kg/m²   No LMP recorded.   No results for input(s): POCGLU in the last 72 hours. General Appearance:  alert, well appearing, and in no acute distress  Mental status: oriented to person, place, and time with normal affect  Head:  normocephalic, atraumatic.   Eye: no icterus, redness, pupils equal and reactive, extraocular eye movements intact, conjunctiva clear  Ear: normal external ear, no discharge, hearing intact  Nose:  no drainage noted  Mouth: mucous membranes moist  Neck: supple, no carotid bruits, thyroid not palpable  Lungs: Bilateral equal air entry, clear to ausculation, no wheezing, rales or rhonchi, normal effort  Cardiovascular: HR 99  normal rate, regular rhythm, no murmur, gallop, rub. Abdomen: mildly tender lower quadrants, nondistended, normal bowel sounds  Neurologic: There are no new focal motor or sensory deficits, normal muscle tone and bulk, no abnormal sensation, normal speech, cranial nerves II through XII grossly intact  Skin: No gross lesions, rashes, bruising or bleeding on exposed skin area  Extremities:  peripheral pulses palpable, no pedal edema or calf pain with palpation  Psych: normal affect     Investigations:      Laboratory Testing:  Recent Results (from the past 24 hour(s))   POCT urine pregnancy    Collection Time: 11/23/20  7:57 AM   Result Value Ref Range    HCG, Pregnancy Urine (POC) NEGATIVE NEGATIVE       Recent Labs     11/23/20  0757   HCG NEGATIVE       Recent Labs     11/19/20  0857   COVID19 Not Detected     Imaging/Diagnostics:    No results found. Diagnosis:      1. Diarrhea     Plans:     1.  Diagnostic colonoscopy       ADIS Donnelly CNP  11/23/2020  8:15 AM

## 2020-11-23 NOTE — ANESTHESIA PRE PROCEDURE
Department of Anesthesiology  Preprocedure Note       Name:  Judith Aguirre   Age:  31 y.o.  :  1990                                          MRN:  [id number]         Date:  2020      Surgeon: Jose Franco):  Kell Beverly MD    Procedure: Procedure(s):  COLONOSCOPY DIAGNOSTIC    Medications prior to admission:   Prior to Admission medications    Medication Sig Start Date End Date Taking? Authorizing Provider   Probiotic Product (ALIGN) 4 MG CAPS Take 1 capsule by mouth daily 10/12/20  Yes Kell Beverly MD   Na Sulfate-K Sulfate-Mg Sulf 17.5-3.13-1.6 GM/177ML SOLN Please follow instructions given by office 10/12/20  Yes Kell Beverly MD       Current medications:    Current Facility-Administered Medications   Medication Dose Route Frequency Provider Last Rate Last Dose    lactated ringers infusion   Intravenous Continuous Rito Gan MD 50 mL/hr at 20 0808      lidocaine PF 1 % injection 1 mL  1 mL Intradermal Once PRN Rito Gan MD           Allergies: Allergies   Allergen Reactions    Amoxicillin     Pcn [Penicillins]        Problem List:    Patient Active Problem List   Diagnosis Code    Dermatitis L30.9    Chronic diarrhea K52.9       Past Medical History:        Diagnosis Date    Cellulitis of digit 2013    Chronic diarrhea     Migraine        Past Surgical History:  History reviewed. No pertinent surgical history. Social History:    Social History     Tobacco Use    Smoking status: Never Smoker    Smokeless tobacco: Never Used   Substance Use Topics    Alcohol use:  Yes     Alcohol/week: 2.0 standard drinks     Types: 2 Glasses of wine per week                                Counseling given: Not Answered      Vital Signs (Current):   Vitals:    20 0754 20 0757   BP:  120/88   Pulse:  99   Resp:  16   Temp:  96 °F (35.6 °C)   TempSrc:  Oral   SpO2:  97%   Weight: 135 lb (61.2 kg)    Height: 5' 3\" (1.6 m)                                               BP Readings from Last 3 Encounters:   11/23/20 120/88   08/31/20 115/80   05/21/19 122/72       NPO Status: Time of last liquid consumption: 0230                        Time of last solid consumption: 2100                        Date of last liquid consumption: 11/23/20                        Date of last solid food consumption: 11/21/20    BMI:   Wt Readings from Last 3 Encounters:   11/23/20 135 lb (61.2 kg)   10/12/20 134 lb (60.8 kg)   08/31/20 132 lb 9.6 oz (60.1 kg)     Body mass index is 23.91 kg/m². CBC:   Lab Results   Component Value Date    WBC 7.8 06/08/2019    RBC 4.54 06/08/2019    HGB 13.5 06/08/2019    HCT 42.7 06/08/2019    MCV 94.1 06/08/2019    RDW 11.9 06/08/2019     06/08/2019       CMP:   Lab Results   Component Value Date     06/08/2019    K 3.9 06/08/2019     06/08/2019    CO2 20 06/08/2019    BUN 15 06/08/2019    CREATININE 0.65 06/08/2019    GFRAA >60 06/08/2019    LABGLOM >60 06/08/2019    GLUCOSE 83 06/08/2019    PROT 7.8 06/08/2019    CALCIUM 9.4 06/08/2019    BILITOT 0.81 06/08/2019    ALKPHOS 56 06/08/2019    AST 19 06/08/2019    ALT 15 06/08/2019       POC Tests: No results for input(s): POCGLU, POCNA, POCK, POCCL, POCBUN, POCHEMO, POCHCT in the last 72 hours.     Coags: No results found for: PROTIME, INR, APTT    HCG (If Applicable):   Lab Results   Component Value Date    PREGTESTUR neg 05/21/2012    HCG NEGATIVE 11/23/2020        ABGs: No results found for: PHART, PO2ART, IDL9LPX, VGZ1AFP, BEART, W8AZDLOE     Type & Screen (If Applicable):  No results found for: LABABO, LABRH    Drug/Infectious Status (If Applicable):  No results found for: HIV, HEPCAB    COVID-19 Screening (If Applicable):   Lab Results   Component Value Date    COVID19 Not Detected 11/19/2020         Anesthesia Evaluation   no history of anesthetic complications:   Airway: Mallampati: II  TM distance: >3 FB   Neck ROM: full  Mouth opening: > = 3 FB Dental:          Pulmonary:Negative Pulmonary ROS and normal exam                               Cardiovascular:  Exercise tolerance: good (>4 METS),       (-)  angina                Neuro/Psych:   Negative Neuro/Psych ROS              GI/Hepatic/Renal: Neg GI/Hepatic/Renal ROS            Endo/Other: Negative Endo/Other ROS                    Abdominal:           Vascular:                                        Anesthesia Plan      TIVA     ASA 1       Induction: intravenous. MIPS: Prophylactic antiemetics administered. Anesthetic plan and risks discussed with patient. Plan discussed with CRNA.     Attending anesthesiologist reviewed and agrees with Ameya Dumont MD   11/23/2020

## 2020-11-23 NOTE — OP NOTE
[facility]     PROCEDURE DATE: 11/23/20    REFERRING PHYSICIAN: No ref. provider found     PRIMARY CARE PROVIDER: ADIS Huggins CNP    ATTENDING PHYSICIAN: Kell Beverly MD     HISTORY: Ms. Judith Aguirre is a 31 y.o. female who presents to the [place] [address] Endoscopy unit for colonoscopy. The patient's clinical history is remarkable for chronic diarrhea. She is currently medically stable and appropriate for the planned procedure. PREOPERATIVE DIAGNOSIS: chronic diarrhea. PROCEDURES:   Transanal Colonoscopy with biopsy. POSTPROCEDURE DIAGNOSIS:  Normal mucosa  Minimal internal hemorrhoids    SPECIMENS: biopsy    MEDICATIONS:     MAC per anesthesia    EBL: minimal    INSTRUMENT: Olympus PCF-H190 AL flexible Colonoscope. PREPARATION: The nature and character of the procedure as well as risks, benefits, and alternatives were discussed with the patient and informed consent was obtained. Complications were said to include, but were not limited to: medication allergy, medication reaction, cardiovascular and respiratory problems, bleeding, perforation, infection, and/or missed diagnosis. Following arrival in the endoscopy room, the patient was placed in the left lateral decubitus position and final time-out accomplished in the presence of the nursing staff. Baseline vital signs were obtained and reviewed, and IV sedation was subsequently initiated. FINDINGS: Rectal examination demonstrated no significant visible external abnormality and digital palpation was unremarkable. Following adequate conscious sedation the colonoscope was introduced and advanced under direct visualization to the terminal ileum. The bowel preparation was felt to be mostly adequate. This included small amounts of thick stool that mostly was able to be adequately irrigated and aspirated. Cecal intubation time was 4 minutes.      Once maximally inserted, the endoscope was withdrawn and the mucosa was carefully inspected. The mucosal exam was normal.  Random biopsies were obtained from right colon to exclude microscopic colitis. Terminal ileum appeared normal.  A few scattered diverticuli were noted in left colon. Retroflexion was performed in the rectum and showed minimal internal hemorrhoids. RECOMMENDATIONS:   1) Follow up with referring provider, as previously scheduled. 2) Follow up on pathology report. 3) Follow-up with me in the office in 3 to 4 weeks.       Vivian Jaramillo

## 2020-11-23 NOTE — ANESTHESIA POSTPROCEDURE EVALUATION
Department of Anesthesiology  Postprocedure Note    Patient: Braulio Rose  MRN: [id number]  YOB: 1990  Date of evaluation: 11/23/2020  Time:  12:23 PM     Procedure Summary     Date:  11/23/20 Room / Location:  [facility]  / [place] [address]    Anesthesia Start:  8891 Anesthesia Stop:  5487    Procedure:  COLONOSCOPY WITH BIOPSY (N/A ) Diagnosis:  (DX DIARRHEA)    Surgeon:  Wilfred Mcmullen MD Responsible Provider:  Zenon Fontanez MD    Anesthesia Type:  TIVA, general ASA Status:  1          Anesthesia Type: TIVA, general    Ted Phase I: Ted Score: 10    Ted Phase II: Tde Score: 9    Last vitals: Reviewed and per EMR flowsheets.        Anesthesia Post Evaluation    Patient location during evaluation: PACU  Patient participation: complete - patient participated  Level of consciousness: awake  Airway patency: patent  Nausea & Vomiting: no nausea  Complications: no  Cardiovascular status: blood pressure returned to baseline  Respiratory status: acceptable  Hydration status: euvolemic

## 2020-11-24 LAB — SURGICAL PATHOLOGY REPORT: NORMAL

## 2020-12-07 ENCOUNTER — OFFICE VISIT (OUTPATIENT)
Dept: GASTROENTEROLOGY | Age: 30
End: 2020-12-07
Payer: COMMERCIAL

## 2020-12-07 VITALS — TEMPERATURE: 97 F | BODY MASS INDEX: 24.27 KG/M2 | WEIGHT: 137 LBS

## 2020-12-07 PROCEDURE — 99213 OFFICE O/P EST LOW 20 MIN: CPT | Performed by: INTERNAL MEDICINE

## 2020-12-07 RX ORDER — OCTISALATE, AVOBENZONE, HOMOSALATE, AND OCTOCRYLENE 29.4; 29.4; 49; 25.48 MG/ML; MG/ML; MG/ML; MG/ML
1 LOTION TOPICAL DAILY
Qty: 30 CAPSULE | Refills: 3 | Status: SHIPPED | OUTPATIENT
Start: 2020-12-07

## 2020-12-07 RX ORDER — DICYCLOMINE HYDROCHLORIDE 10 MG/1
10 CAPSULE ORAL
Qty: 120 CAPSULE | Refills: 3 | Status: SHIPPED | OUTPATIENT
Start: 2020-12-07 | End: 2021-05-18

## 2020-12-07 ASSESSMENT — ENCOUNTER SYMPTOMS
DIARRHEA: 1
WHEEZING: 0
RECTAL PAIN: 0
BACK PAIN: 0
TROUBLE SWALLOWING: 0
BLOOD IN STOOL: 0
VOMITING: 0
SORE THROAT: 0
ABDOMINAL DISTENTION: 1
CONSTIPATION: 0
COUGH: 0
CHOKING: 0
VOICE CHANGE: 1
NAUSEA: 0
ABDOMINAL PAIN: 1
ANAL BLEEDING: 0
SINUS PRESSURE: 1

## 2020-12-17 ENCOUNTER — TELEPHONE (OUTPATIENT)
Dept: GASTROENTEROLOGY | Age: 30
End: 2020-12-17

## 2020-12-21 NOTE — TELEPHONE ENCOUNTER
Writer left patient a message that she shouldn't worry as she has some internal hemorrhoids and excessive wiping can cause it as well. We will see her at her follow up appointment on the 11 th of January.

## 2021-02-17 ENCOUNTER — HOSPITAL ENCOUNTER (OUTPATIENT)
Age: 31
Discharge: HOME | End: 2021-02-17

## 2021-02-17 LAB — RUBV IGG SER QL: 60.9 IU/ML

## 2021-02-17 PROCEDURE — 86787 VARICELLA-ZOSTER ANTIBODY: CPT

## 2021-02-17 PROCEDURE — 86762 RUBELLA ANTIBODY: CPT

## 2021-02-17 PROCEDURE — 86765 RUBEOLA ANTIBODY: CPT

## 2021-02-17 PROCEDURE — 36415 COLL VENOUS BLD VENIPUNCTURE: CPT

## 2021-02-17 PROCEDURE — 86735 MUMPS ANTIBODY: CPT

## 2021-02-17 PROCEDURE — 86480 TB TEST CELL IMMUN MEASURE: CPT

## 2021-02-19 LAB
MEASLES ANTIBODY IGG: 2.55
MUV IGG SER QL: 3.78
QUANTI TB GOLD PLUS: NEGATIVE
QUANTI TB1 MINUS NIL: 0 IU/ML (ref 0–0.34)
QUANTI TB2 MINUS NIL: 0 IU/ML (ref 0–0.34)
QUANTIFERON MITOGEN: 7.45 IU/ML
QUANTIFERON NIL: 0.05 IU/ML
VZV IGG SER QL IA: 2.35

## 2021-05-25 ENCOUNTER — HOSPITAL ENCOUNTER (OUTPATIENT)
Age: 31
Setting detail: SPECIMEN
Discharge: HOME | End: 2021-05-25
Payer: COMMERCIAL

## 2021-05-25 LAB
ABO/RH: NORMAL
ANTIBODY SCREEN: NEGATIVE
HCG QUALITATIVE: NEGATIVE
HISTORY CHECK: NORMAL
HIV AG/AB: NONREACTIVE
PROLACTIN: 23.11 UG/L (ref 4.79–23.3)
T. PALLIDUM, IGG: NONREACTIVE

## 2021-05-26 LAB
C. TRACHOMATIS DNA ,URINE: NEGATIVE
CMV IGM: 0.3
CYTOMEGALOVIRUS IGG ANTIBODY: 0.1
ESTIMATED AVERAGE GLUCOSE: 97 MG/DL
HBA1C MFR BLD: 5 % (ref 4–6)
HEPATITIS B CORE TOTAL ANTIBODY: NONREACTIVE
HEPATITIS B SURFACE ANTIGEN: NONREACTIVE
HEPATITIS C ANTIBODY: NONREACTIVE
N. GONORRHOEAE DNA, URINE: NEGATIVE
RUBV IGG SER QL: 57.7 IU/ML
SPECIMEN DESCRIPTION: NORMAL
TSH SERPL DL<=0.05 MIU/L-ACNC: 1.78 MIU/L (ref 0.3–5)
VITAMIN D 25-HYDROXY: 23.5 NG/ML (ref 30–100)
VZV IGG SER QL IA: 1.96

## 2021-05-28 LAB — ANTI-MULLERIAN HORMONE: 7.27 NG/ML (ref 0.18–11.71)

## 2021-06-01 LAB
FACTOR V MUTATION: NEGATIVE
PROTHROMBIN G20210A MUTATION: NEGATIVE
PT PCR SPECIMEN: NORMAL
SPECIMEN: NORMAL

## 2021-12-01 ENCOUNTER — OFFICE VISIT (OUTPATIENT)
Dept: FAMILY MEDICINE CLINIC | Age: 31
End: 2021-12-01
Payer: COMMERCIAL

## 2021-12-01 VITALS
SYSTOLIC BLOOD PRESSURE: 110 MMHG | WEIGHT: 139 LBS | DIASTOLIC BLOOD PRESSURE: 78 MMHG | BODY MASS INDEX: 24.62 KG/M2 | OXYGEN SATURATION: 99 % | HEART RATE: 66 BPM | TEMPERATURE: 97 F

## 2021-12-01 DIAGNOSIS — R06.83 SNORING: Primary | ICD-10-CM

## 2021-12-01 PROCEDURE — 99213 OFFICE O/P EST LOW 20 MIN: CPT | Performed by: NURSE PRACTITIONER

## 2021-12-01 SDOH — ECONOMIC STABILITY: FOOD INSECURITY: WITHIN THE PAST 12 MONTHS, THE FOOD YOU BOUGHT JUST DIDN'T LAST AND YOU DIDN'T HAVE MONEY TO GET MORE.: NEVER TRUE

## 2021-12-01 SDOH — ECONOMIC STABILITY: FOOD INSECURITY: WITHIN THE PAST 12 MONTHS, YOU WORRIED THAT YOUR FOOD WOULD RUN OUT BEFORE YOU GOT MONEY TO BUY MORE.: NEVER TRUE

## 2021-12-01 ASSESSMENT — PATIENT HEALTH QUESTIONNAIRE - PHQ9
SUM OF ALL RESPONSES TO PHQ9 QUESTIONS 1 & 2: 0
1. LITTLE INTEREST OR PLEASURE IN DOING THINGS: 0
SUM OF ALL RESPONSES TO PHQ QUESTIONS 1-9: 0
2. FEELING DOWN, DEPRESSED OR HOPELESS: 0

## 2021-12-01 ASSESSMENT — SOCIAL DETERMINANTS OF HEALTH (SDOH): HOW HARD IS IT FOR YOU TO PAY FOR THE VERY BASICS LIKE FOOD, HOUSING, MEDICAL CARE, AND HEATING?: NOT HARD AT ALL

## 2021-12-01 NOTE — PROGRESS NOTES
Trinidad Alvarado, YU-WILLIAN  [address]  [id number] [facility]lene [address]  [place], [address]  [phone]  [phone]    Eduardo Fortune is a 27 y.o. female presents today for Chief Complaint: Snoring      Patient is Accompanied by: n/a    HPI - Eduardo Fortune is here today for the following:     Snoring started about 1 year ago. She reports her wife has noticed on occasion that she stops breathing, but not often. She is also waking with a sore throat at times. She denies waking with a headache, daytime sleepiness, chronic sinusitis, or allergies. She has not tried anything for her symptoms          Patient Active Problem List   Diagnosis    Dermatitis    Chronic diarrhea     Past Medical History:   Diagnosis Date    Cellulitis of digit 8/2013    Chronic diarrhea     Migraine       Past Surgical History:   Procedure Laterality Date    COLONOSCOPY N/A 11/23/2020    COLONOSCOPY WITH BIOPSY performed by Ashwini Marcus MD at [address] [place] [address] History   Problem Relation Age of Onset    Cancer Paternal Aunt 48        breast    High Blood Pressure Father     High Cholesterol Father     Depression Father      Social History     Tobacco Use    Smoking status: Never Smoker    Smokeless tobacco: Never Used   Substance Use Topics    Alcohol use: Yes     Alcohol/week: 2.0 standard drinks     Types: 2 Glasses of wine per week     ALLERGIES:    Allergies   Allergen Reactions    Amoxicillin     Pcn [Penicillins]           Subjective     · Constitutional:  Negative for activity change, appetite change,unexpected weight change, chills, fever, and fatigue. · HENT: Negative for ear pain, sore throat,  Rhinorrhea, sinus pain, sinus pressure, congestion. · Eyes:  Negative for pain and discharge. · Respiratory:  Negative for chest tightness, shortness of breath, wheezing, and cough. Positive for snoring  · Cardiovascular:  Negative for chest pain, palpitations and leg swelling.    · Gastrointestinal: Negative for abdominal pain, blood in stool, constipation,diarrhea, nausea and vomiting. · Endocrine: Negative for cold intolerance, heat intolerance, polydipsia, polyphagia and polyuria. · Genitourinary: Negative for difficulty urinating, dysuria, flank pain, frequency, hematuria and urgency. · Musculoskeletal: Negative for arthralgias, back pain, joint swelling, myalgias, neck pain and neck stiffness. · Skin: Negative for rash and wound. · Allergic/Immunologic: Negative for environmental allergies and food allergies. · Neurological:  Negative for dizziness, light-headedness, numbness and headaches. · Hematological:  Negative for adenopathy. Does not bruise/bleed easily. · Psychiatric/Behavioral: Negative for self-injury, sleep disturbance and suicidal ideas. Objective     PHYSICAL EXAM:   · Constitutional: Lawrence Cisse is oriented to person, place, and time. Vital signs are normal. Appears well-developed and well-nourished. · HEENT:   · Head: Normocephalic and atraumatic. Neck: Full passive range of motion. · Cardiovascular: Normal rate, regular rhythm, S1, S2, no murmur, no gallop, no friction rub, intact distal pulses. No carotid bruit. No lower extremity edema  · Pulmonary/Chest: Breath sounds are clear throughout, No respiratory distress, No wheezing, No chest tenderness. Effort normal  · Lymphadenopathy: No lymphadenopathy noted. · Neurological: Alert and oriented to person, place, and time. Normal motor function, Normal sensory function, No focal deficits noted. He has normal strength. · Skin: Skin is warm, dry and intact. No obvious lesions on exposed skin  · Psychiatric: Normal mood and affect. Speech is normal and behavior is normal.     Nursing note and vitals reviewed. Blood pressure 110/78, pulse 66, temperature 97 °F (36.1 °C), temperature source Temporal, weight 139 lb (63 kg), SpO2 99 %. Body mass index is 24.62 kg/m².     Wt Readings from Last 3 Encounters:   12/01/21 139 lb (63 kg)   05/18/21 137 lb (62.1 kg)   12/07/20 137 lb (62.1 kg)     BP Readings from Last 3 Encounters:   12/01/21 110/78   05/18/21 118/78   11/23/20 104/72       No results found for this visit on 12/01/21. Completed Orders/Prescriptions   No orders of the defined types were placed in this encounter. AssessmentPlan/Medical Decision Making     1. Snoring  - symptoms are not convincing for sleep apnea - will refer for further evaluation  - encouraged her to try breathe right strips  - [facility] - Duncan Cleveland MD, Otolaryngology, Modesta Salgado MD, Pulmonology, [facility]      Return if symptoms worsen or fail to improve. 1.  Christie received counseling on the following healthy behaviors: nutrition, exercise and medication adherence  2. Patient given educational materials - see patient instructions  3. Was a self-tracking handout given in paper form or via [employer]? No  If yes, see orders or list here. 4.  Discussed use, benefit, and side effects of prescribed medications. Barriers to medication compliance addressed. All patient questions answered. Pt voiced understanding. 5.  Reviewed prior labs, imaging, consultation, follow up, and health maintenance  6. Continue current medications, diet and exercise. 7. Discussed use, benefit, and side effects of prescribed medications. Barriers to medication compliance addressed. All her questions were answered. Pt voiced understanding. Lawrence Cisse will continue current medications, diet and exercise. Medical Decision Making: Low    Of the 25 minute duration appointment visit, Janeen Tamayo CNP spent at least 50% of the face-to-face time in counseling, explanation of diagnosis, planning of further management, and answering all questions.     Signed:  Janeen Tamayo CNP    This note is created with the assistance of a speech-recognition program.  While intending to generate a document that actually reflects the content of the

## 2025-07-02 ENCOUNTER — OFFICE VISIT (OUTPATIENT)
Age: 35
End: 2025-07-02

## 2025-07-02 VITALS
WEIGHT: 145 LBS | OXYGEN SATURATION: 98 % | BODY MASS INDEX: 24.75 KG/M2 | SYSTOLIC BLOOD PRESSURE: 116 MMHG | HEART RATE: 75 BPM | TEMPERATURE: 97.8 F | HEIGHT: 64 IN | DIASTOLIC BLOOD PRESSURE: 75 MMHG

## 2025-07-02 DIAGNOSIS — L23.9 ALLERGIC CONTACT DERMATITIS, UNSPECIFIED TRIGGER: Primary | ICD-10-CM

## 2025-07-02 PROBLEM — G47.33 OBSTRUCTIVE SLEEP APNEA SYNDROME: Status: ACTIVE | Noted: 2024-03-21

## 2025-07-02 RX ORDER — METHYLPREDNISOLONE 4 MG/1
TABLET ORAL
Qty: 1 KIT | Refills: 0 | Status: SHIPPED | OUTPATIENT
Start: 2025-07-02

## 2025-07-02 RX ORDER — DEXAMETHASONE SODIUM PHOSPHATE 10 MG/ML
10 INJECTION, SOLUTION INTRA-ARTICULAR; INTRALESIONAL; INTRAMUSCULAR; INTRAVENOUS; SOFT TISSUE ONCE
Status: COMPLETED | OUTPATIENT
Start: 2025-07-02 | End: 2025-07-02

## 2025-07-02 RX ADMIN — DEXAMETHASONE SODIUM PHOSPHATE 10 MG: 10 INJECTION, SOLUTION INTRA-ARTICULAR; INTRALESIONAL; INTRAMUSCULAR; INTRAVENOUS; SOFT TISSUE at 15:17

## 2025-07-02 ASSESSMENT — ENCOUNTER SYMPTOMS
SHORTNESS OF BREATH: 0
RHINORRHEA: 0
RESPIRATORY NEGATIVE: 1
EYE PAIN: 0

## 2025-07-02 NOTE — PROGRESS NOTES
[facility], [facility] ([place])  [facility] [place] [address]  Dept: [phone]    Date: 25    Visit date not found      Christie Garrido (:  1990) is a 35 y.o. female, here for evaluation of the following chief complaint(s):  Poison Ivy (9 days.)      HPI    History provided by:  Patient  Rash  This is a new problem. Episode onset: 9 days. The problem is unchanged. The rash is diffuse. The rash is characterized by blistering, redness, itchiness and peeling. Associated with: patient was doing yard work. Pertinent negatives include no congestion, eye pain, facial edema, fatigue, fever, rhinorrhea or shortness of breath. Past treatments include topical steroids, anti-itch cream and analgesics.          Past Medical History:   Diagnosis Date    Cellulitis of digit 2013    Chronic diarrhea     Migraine          ROS  Review of Systems   Constitutional:  Negative for fatigue and fever.   HENT: Negative.  Negative for congestion and rhinorrhea.    Eyes:  Negative for pain.   Respiratory: Negative.  Negative for shortness of breath.    Cardiovascular: Negative.    Skin:  Positive for rash.   Neurological:  Negative for dizziness and headaches.       PHYSICAL EXAM  Vitals:    25 1506   BP: 116/75   Pulse: 75   Temp: 97.8 °F (36.6 °C)   SpO2: 98%   Weight: 65.8 kg (145 lb)   Height: 1.626 m (5' 4\")     Physical Exam  Constitutional:       General: She is not in acute distress.     Appearance: Normal appearance.   HENT:      Head: Normocephalic.      Nose: Nose normal.   Eyes:      Extraocular Movements: Extraocular movements intact.   Cardiovascular:      Rate and Rhythm: Normal rate.   Pulmonary:      Effort: Pulmonary effort is normal. No respiratory distress.   Abdominal:      Palpations: Abdomen is soft.   Skin:     General: Skin is warm.      Findings: Rash present. Rash is macular, papular, urticarial and vesicular.   Neurological:      General: No

## (undated) DEVICE — ELECTRODE PT RET AD L9FT HI MOIST COND ADH HYDRGEL CORDED

## (undated) DEVICE — BASIN EMSIS 700ML GRAPHITE PLAS KID SHP GRAD

## (undated) DEVICE — ADAPTER TBNG LUER STUB 15 GA INTMED

## (undated) DEVICE — YANKAUER,FLEXIBLE HANDLE,REGLR CAPACITY: Brand: MEDLINE INDUSTRIES, INC.

## (undated) DEVICE — GAUZE,SPONGE,4"X4",16PLY,STRL,LF,10/TRAY: Brand: MEDLINE

## (undated) DEVICE — TUBING, SUCTION, 1/4" X 12', STRAIGHT: Brand: MEDLINE

## (undated) DEVICE — GOWN,AURORA,NONREINFORCED,LARGE: Brand: MEDLINE

## (undated) DEVICE — FORCEPS BX L240CM WRK CHN 2.8MM STD CAP W/ NDL MIC MESH

## (undated) DEVICE — CUP MED 1OZ CLR POLYPR FEED GRAD W/O LID

## (undated) DEVICE — SYRINGE MED 50ML LUERLOCK TIP

## (undated) DEVICE — Device: Brand: DEFENDO VALVE AND CONNECTOR KIT

## (undated) DEVICE — CO2 CANNULA,SUPERSOFT, ADLT,7'O2,7'CO2: Brand: MEDLINE

## (undated) DEVICE — MEDICINE CUP, GRADUATED, STER: Brand: MEDLINE

## (undated) DEVICE — JELLY,LUBE,STERILE,FLIP TOP,TUBE,2-OZ: Brand: MEDLINE